# Patient Record
Sex: MALE | Race: WHITE
[De-identification: names, ages, dates, MRNs, and addresses within clinical notes are randomized per-mention and may not be internally consistent; named-entity substitution may affect disease eponyms.]

---

## 2022-08-01 ENCOUNTER — HOSPITAL ENCOUNTER (INPATIENT)
Dept: HOSPITAL 93 - ER | Age: 52
LOS: 9 days | Discharge: HOME | DRG: 177 | End: 2022-08-10
Attending: INTERNAL MEDICINE | Admitting: INTERNAL MEDICINE
Payer: COMMERCIAL

## 2022-08-01 VITALS — WEIGHT: 195 LBS | HEIGHT: 71 IN | BODY MASS INDEX: 27.3 KG/M2

## 2022-08-01 DIAGNOSIS — E05.90: ICD-10-CM

## 2022-08-01 DIAGNOSIS — R06.02: ICD-10-CM

## 2022-08-01 DIAGNOSIS — D75.838: ICD-10-CM

## 2022-08-01 DIAGNOSIS — N39.0: ICD-10-CM

## 2022-08-01 DIAGNOSIS — D50.8: ICD-10-CM

## 2022-08-01 DIAGNOSIS — U07.1: Primary | ICD-10-CM

## 2022-08-01 DIAGNOSIS — G35: ICD-10-CM

## 2022-08-01 DIAGNOSIS — E86.0: ICD-10-CM

## 2022-08-01 DIAGNOSIS — J12.82: ICD-10-CM

## 2022-08-01 NOTE — NUR
PACIENTE QUE LLEGA EN COMPANIA DE PERSONAL DE EMERGENCIAS MEDICAS Y FAMILIAR
DESDE EL CDT DE CAGUAS POR PNEUMONIA.

## 2022-08-02 PROCEDURE — 8E0ZXY6 ISOLATION: ICD-10-PCS | Performed by: INTERNAL MEDICINE

## 2022-08-03 PROCEDURE — XW033E5 INTRODUCTION OF REMDESIVIR ANTI-INFECTIVE INTO PERIPHERAL VEIN, PERCUTANEOUS APPROACH, NEW TECHNOLOGY GROUP 5: ICD-10-PCS | Performed by: INTERNAL MEDICINE

## 2022-08-05 PROCEDURE — BW24ZZZ COMPUTERIZED TOMOGRAPHY (CT SCAN) OF CHEST AND ABDOMEN: ICD-10-PCS | Performed by: INTERNAL MEDICINE

## 2022-08-30 ENCOUNTER — HOSPITAL ENCOUNTER (INPATIENT)
Dept: HOSPITAL 93 - ER | Age: 52
LOS: 17 days | Discharge: HOME | DRG: 194 | End: 2022-09-16
Attending: INTERNAL MEDICINE | Admitting: INTERNAL MEDICINE
Payer: COMMERCIAL

## 2022-08-30 VITALS — HEIGHT: 70 IN | WEIGHT: 180 LBS | BODY MASS INDEX: 25.77 KG/M2

## 2022-08-30 DIAGNOSIS — D75.838: ICD-10-CM

## 2022-08-30 DIAGNOSIS — D50.9: ICD-10-CM

## 2022-08-30 DIAGNOSIS — J18.0: Primary | ICD-10-CM

## 2022-08-30 DIAGNOSIS — G35: ICD-10-CM

## 2022-08-30 DIAGNOSIS — R60.0: ICD-10-CM

## 2022-08-30 DIAGNOSIS — A08.39: ICD-10-CM

## 2022-08-30 DIAGNOSIS — I87.2: ICD-10-CM

## 2022-08-30 DIAGNOSIS — U09.9: ICD-10-CM

## 2022-08-30 PROCEDURE — BB24ZZZ COMPUTERIZED TOMOGRAPHY (CT SCAN) OF BILATERAL LUNGS: ICD-10-PCS | Performed by: EMERGENCY MEDICINE

## 2022-08-31 PROCEDURE — BG44ZZZ ULTRASONOGRAPHY OF THYROID GLAND: ICD-10-PCS | Performed by: INTERNAL MEDICINE

## 2022-08-31 PROCEDURE — 3E0F7GC INTRODUCTION OF OTHER THERAPEUTIC SUBSTANCE INTO RESPIRATORY TRACT, VIA NATURAL OR ARTIFICIAL OPENING: ICD-10-PCS | Performed by: INTERNAL MEDICINE

## 2022-09-06 PROCEDURE — BB24Y0Z COMPUTERIZED TOMOGRAPHY (CT SCAN) OF BILATERAL LUNGS USING OTHER CONTRAST, UNENHANCED AND ENHANCED: ICD-10-PCS | Performed by: INTERNAL MEDICINE

## 2022-09-13 PROCEDURE — B54DZZZ ULTRASONOGRAPHY OF BILATERAL LOWER EXTREMITY VEINS: ICD-10-PCS | Performed by: INTERNAL MEDICINE
